# Patient Record
Sex: MALE | Race: WHITE | Employment: OTHER | URBAN - METROPOLITAN AREA
[De-identification: names, ages, dates, MRNs, and addresses within clinical notes are randomized per-mention and may not be internally consistent; named-entity substitution may affect disease eponyms.]

---

## 2023-05-25 ENCOUNTER — HOSPITAL ENCOUNTER (EMERGENCY)
Age: 73
Discharge: HOME | End: 2023-05-25
Attending: EMERGENCY MEDICINE
Payer: MEDICARE

## 2023-05-25 ENCOUNTER — APPOINTMENT (OUTPATIENT)
Dept: GENERAL RADIOLOGY | Age: 73
End: 2023-05-25
Payer: MEDICARE

## 2023-05-25 VITALS
DIASTOLIC BLOOD PRESSURE: 69 MMHG | OXYGEN SATURATION: 92 % | SYSTOLIC BLOOD PRESSURE: 126 MMHG | RESPIRATION RATE: 15 BRPM | WEIGHT: 195 LBS | TEMPERATURE: 98.3 F | HEART RATE: 69 BPM

## 2023-05-25 DIAGNOSIS — T17.908A ASPIRATION INTO AIRWAY, INITIAL ENCOUNTER: Primary | ICD-10-CM

## 2023-05-25 PROBLEM — R06.02 SOB (SHORTNESS OF BREATH): Status: ACTIVE | Noted: 2023-05-25

## 2023-05-25 PROBLEM — R06.2 WHEEZING: Status: ACTIVE | Noted: 2023-05-25

## 2023-05-25 PROCEDURE — 99153 MOD SED SAME PHYS/QHP EA: CPT | Performed by: INTERNAL MEDICINE

## 2023-05-25 PROCEDURE — 99222 1ST HOSP IP/OBS MODERATE 55: CPT | Performed by: INTERNAL MEDICINE

## 2023-05-25 PROCEDURE — 6360000002 HC RX W HCPCS: Performed by: INTERNAL MEDICINE

## 2023-05-25 PROCEDURE — 2709999900 HC NON-CHARGEABLE SUPPLY: Performed by: INTERNAL MEDICINE

## 2023-05-25 PROCEDURE — 6370000000 HC RX 637 (ALT 250 FOR IP): Performed by: INTERNAL MEDICINE

## 2023-05-25 PROCEDURE — 2580000003 HC RX 258: Performed by: INTERNAL MEDICINE

## 2023-05-25 PROCEDURE — 99152 MOD SED SAME PHYS/QHP 5/>YRS: CPT | Performed by: INTERNAL MEDICINE

## 2023-05-25 PROCEDURE — 96374 THER/PROPH/DIAG INJ IV PUSH: CPT

## 2023-05-25 PROCEDURE — 99285 EMERGENCY DEPT VISIT HI MDM: CPT

## 2023-05-25 PROCEDURE — 3609010800 HC BRONCHOSCOPY ALVEOLAR LAVAGE: Performed by: INTERNAL MEDICINE

## 2023-05-25 PROCEDURE — 87070 CULTURE OTHR SPECIMN AEROBIC: CPT

## 2023-05-25 PROCEDURE — 93005 ELECTROCARDIOGRAM TRACING: CPT | Performed by: EMERGENCY MEDICINE

## 2023-05-25 PROCEDURE — 3609010900 HC BRONCHOSCOPY THERAPUTIC ASPIRATION INITIAL: Performed by: INTERNAL MEDICINE

## 2023-05-25 PROCEDURE — 2500000003 HC RX 250 WO HCPCS: Performed by: INTERNAL MEDICINE

## 2023-05-25 PROCEDURE — A4217 STERILE WATER/SALINE, 500 ML: HCPCS | Performed by: INTERNAL MEDICINE

## 2023-05-25 PROCEDURE — 87205 SMEAR GRAM STAIN: CPT

## 2023-05-25 PROCEDURE — 71046 X-RAY EXAM CHEST 2 VIEWS: CPT

## 2023-05-25 PROCEDURE — 31645 BRNCHSC W/THER ASPIR 1ST: CPT | Performed by: INTERNAL MEDICINE

## 2023-05-25 RX ORDER — IPRATROPIUM BROMIDE AND ALBUTEROL SULFATE 2.5; .5 MG/3ML; MG/3ML
1 SOLUTION RESPIRATORY (INHALATION) EVERY 4 HOURS PRN
Status: DISCONTINUED | OUTPATIENT
Start: 2023-05-25 | End: 2023-05-25 | Stop reason: HOSPADM

## 2023-05-25 RX ORDER — MAGNESIUM HYDROXIDE 1200 MG/15ML
LIQUID ORAL CONTINUOUS PRN
Status: COMPLETED | OUTPATIENT
Start: 2023-05-25 | End: 2023-05-25

## 2023-05-25 RX ORDER — LIDOCAINE HYDROCHLORIDE 20 MG/ML
INJECTION, SOLUTION INFILTRATION; PERINEURAL PRN
Status: DISCONTINUED | OUTPATIENT
Start: 2023-05-25 | End: 2023-05-25 | Stop reason: ALTCHOICE

## 2023-05-25 RX ORDER — MIDAZOLAM HYDROCHLORIDE 5 MG/ML
INJECTION INTRAMUSCULAR; INTRAVENOUS PRN
Status: DISCONTINUED | OUTPATIENT
Start: 2023-05-25 | End: 2023-05-25 | Stop reason: ALTCHOICE

## 2023-05-25 RX ORDER — PANTOPRAZOLE SODIUM 20 MG/1
20 TABLET, DELAYED RELEASE ORAL
Qty: 30 TABLET | Refills: 0 | Status: SHIPPED | OUTPATIENT
Start: 2023-05-25

## 2023-05-25 RX ORDER — FENTANYL CITRATE 50 UG/ML
INJECTION, SOLUTION INTRAMUSCULAR; INTRAVENOUS PRN
Status: DISCONTINUED | OUTPATIENT
Start: 2023-05-25 | End: 2023-05-25 | Stop reason: ALTCHOICE

## 2023-05-25 RX ORDER — ACETYLCYSTEINE 200 MG/ML
SOLUTION ORAL; RESPIRATORY (INHALATION) PRN
Status: DISCONTINUED | OUTPATIENT
Start: 2023-05-25 | End: 2023-05-25 | Stop reason: ALTCHOICE

## 2023-05-25 RX ORDER — METHYLPREDNISOLONE SODIUM SUCCINATE 125 MG/2ML
80 INJECTION, POWDER, LYOPHILIZED, FOR SOLUTION INTRAMUSCULAR; INTRAVENOUS DAILY
Status: DISCONTINUED | OUTPATIENT
Start: 2023-05-25 | End: 2023-05-25 | Stop reason: HOSPADM

## 2023-05-25 RX ORDER — METHYLPREDNISOLONE 4 MG/1
TABLET ORAL
Qty: 1 KIT | Refills: 0 | Status: SHIPPED | OUTPATIENT
Start: 2023-05-25 | End: 2023-05-31

## 2023-05-25 RX ADMIN — IPRATROPIUM BROMIDE AND ALBUTEROL SULFATE 1 AMPULE: .5; 2.5 SOLUTION RESPIRATORY (INHALATION) at 16:24

## 2023-05-25 RX ADMIN — METHYLPREDNISOLONE SODIUM SUCCINATE 80 MG: 125 INJECTION INTRAMUSCULAR; INTRAVENOUS at 16:24

## 2023-05-25 ASSESSMENT — PAIN SCALES - GENERAL: PAINLEVEL_OUTOF10: 0

## 2023-05-25 ASSESSMENT — PAIN - FUNCTIONAL ASSESSMENT: PAIN_FUNCTIONAL_ASSESSMENT: 0-10

## 2023-05-25 NOTE — CONSULTS
INPATIENT PULMONARY CRITICAL CARE CONSULT NOTE      Chief Complaint/Referring Provider:  Patient is being seen at the request of Dr. Jonathan Wong for a consultation for aspirated tablet;new wheezing     Presenting HPI: Patient came to the hospital because of increasing shortness of breath    As per ER provider-Patient is a generally healthy 68-year-old male denies any medical history denies any medications who presents referred from urgent care for concern for aspiration of a supplement that he took this morning. Patient was swallowing his vitamins and took a kelp supplement and reports that he aspirated it and tried to cough it out but has been unsuccessful. He has had fairly persistent cough and feels like he \"has bronchitis\". Can feel wheezing in his chest.  Denies any shortness of breath. He takes a deep breath or does anything exertional triggers a coughing episode. No lightheadedness. Patient when seen was still symptomatic, patient was still having some shortness of breath and congestion along with wheezing, no chest pain, patient was not having any significant expectoration, patient was afebrile and hemodynamically maintained, patient was alert and oriented, no other pertinent review of system of concern     Patient Active Problem List    Diagnosis Date Noted    SOB (shortness of breath) 05/25/2023    Wheezing 05/25/2023    Aspiration into airway 05/25/2023       History reviewed. No pertinent past medical history. History reviewed. No pertinent surgical history. History reviewed. No pertinent family history. Social History     Tobacco Use    Smoking status: Never    Smokeless tobacco: Never   Substance Use Topics    Alcohol use: Not on file        No Known Allergies            Physical Exam:  Blood pressure (!) 147/78, pulse 67, temperature 98.3 °F (36.8 °C), temperature source Oral, resp. rate 17, weight 195 lb (88.5 kg), SpO2 97 %.'   Constitutional:  No acute distress.    HENT:  Oropharynx

## 2023-05-25 NOTE — ED PROVIDER NOTES
Emergency Department Provider Note  Location: [facility]  ED  5/25/2023     Patient Identification  Ivy Frank is a 81 y.o. male    Chief Complaint  Shortness of Breath (Sent by urgent care for aspiration pneumonia, feels like he had a supplement go down the wrong tube, having a lot of wheezing, started this am)          HPI  (History provided by patient)  Patient is a generally healthy 74-year-old male denies any medical history denies any medications who presents referred from urgent care for concern for aspiration of a supplement that he took this morning. Patient was swallowing his vitamins and took a kelp supplement and reports that he aspirated it and tried to cough it out but has been unsuccessful. He has had fairly persistent cough and feels like he \"has bronchitis\". Can feel wheezing in his chest.  Denies any shortness of breath. He takes a deep breath or does anything exertional triggers a coughing episode. No lightheadedness. I have reviewed the following nursing documentation:  Allergies: No Known Allergies    Past medical history:  has no past medical history on file. Past surgical history:  has no past surgical history on file. Home medications:   Prior to Admission medications    Medication Sig Start Date End Date Taking? Authorizing Provider   methylPREDNISolone (MEDROL DOSEPACK) 4 MG tablet Take by mouth. 5/25/23 5/31/23 Yes Ryne Wynn MD   pantoprazole (PROTONIX) 20 MG tablet Take 1 tablet by mouth every morning (before breakfast) 5/25/23  Yes Ryne Wynn MD       Social history:  reports that he has never smoked. He has never used smokeless tobacco.    Family history:  History reviewed. No pertinent family history. Exam  ED Triage Vitals [05/25/23 1445]   BP Temp Temp Source Pulse Respirations SpO2 Height Weight - Scale   (!) 147/78 98.3 °F (36.8 °C) Oral 67 17 97 % -- 195 lb (88.5 kg)       Physical Exam  Vitals and nursing note reviewed.

## 2023-05-25 NOTE — ED NOTES
Writer ambulated patient ~100 feet to ER bathroom and back to room 1 with steady gait. Patient reports no dizziness or trouble walking. Provider aware.       Ricardo Pond RN  05/25/23 7495

## 2023-05-25 NOTE — PRE SEDATION
Sedation Pre-Procedure Note    Patient Name: Griselda Grumbles   YOB: 1950  Room/Bed: 01/01  Medical Record Number: [id number]  Date: 5/25/2023   Time: 6:00 PM       Indication: Aspiration of tablet into the airway, shortness of breath and wheezing    Consent: I have discussed with the patient and/or the patient representative the indication, alternatives, and the possible risks and/or complications of the planned procedure and the anesthesia methods. The patient and/or patient representative appear to understand and agree to proceed. Vital Signs:   Vitals:    05/25/23 1740   BP: (!) 150/63   Pulse: 72   Resp: 25   Temp:    SpO2:        Past Medical History:   has no past medical history on file. Past Surgical History:   has no past surgical history on file. Medications:   Scheduled Meds:    methylPREDNISolone  80 mg IntraVENous Daily     Continuous Infusions:   PRN Meds: ipratropium 0.5 mg-albuterol 2.5 mg  Home Meds:   Prior to Admission medications    Not on File         Pre-Sedation Documentation and Exam:   Vital signs have been reviewed (see flow sheet for vitals).     Mallampati Airway Assessment:  Mallampati Class II - (soft palate, fauces & uvula are visible)    Prior History of Anesthesia Complications:   none    ASA Classification:  Class 4 - A patient with an incapacitating systemic disease that is a constant threat to life    Sedation/ Anesthesia Plan:   intravenous sedation    Medications Planned:   midazolam (Versed) intravenously and fentanyl intravenously    Patient is an appropriate candidate for plan of sedation: yes    Electronically signed by Karla Hale MD on 5/25/2023 at 6:00 PM

## 2023-05-25 NOTE — ED NOTES
Writer reviewed discharge instructions, medications, and follow-up with PCP, otolaryngology, GI, and Pulmonology; patient verbalized understanding. Patient's IV removed with no complications with dressing in place. Patient stable, ambulatory with steady gait, GCS 15, no signs/ symptoms of acute distress, respirations unlabored, and with friend.       Haydee French RN  05/25/23 1930

## 2023-05-25 NOTE — ED NOTES
@7203 PS  per DR. Nieves  RE:aspirated tablet, new wheezing  @1991 Noam Green was in ER Department and saw pt

## 2023-05-25 NOTE — PROCEDURES
Bronchoscopy note    Patient with shortness of breath, acute wheezing and aspiration of a tablet into the airway and given the patient's clinical status and the data available it was decided to do an emergent endoscopy and for that reason after informed consent, endoscopy team was requested to come to the bedside, patient was given oropharyngeal analgesia with benzocaine after timeout, patient was given lidocaine for tracheobronchial analgesia as following-Physician/patient of face-to-face sedation start time was 5.17 pm  Physician/patient face-to-face sedation stop time was 5.35 pm   Total moderate sedation time in minutes was 18 mi nuts   The patient was monitored continuously  throughout the entire procedure while the sedation was being administered    Patient was given conscious sedation with 5 mg of Versed and 100 mcg of fentanyl for the procedure in stages, the bronchoscope was increased to the mouth using a bite block, patient was found to have incomplete adduction of the vocal cords, patient's trachea was normal, patient's jorge l was sharp, patient's left tracheobronchial tree was normal, patient's right upper lobe bronchus was normal, patient had some secretions right middle lobe which was suctioned out, patient is entire right lower lobe bronchus had dissolving particles of the tablet in pieces along with that there was a lot of particulate matter which was stuck on the jorge l was of the various subsegments of the right lower lobe which were removed piece by piece using Mucomyst and saline with improvement in the patency of the airways, patient was also found to have some tracheobronchomalacia, patient did not have any endobronchial lesion, patient tolerated the procedure well and did not have any apparent immediate complications  Patient was snoring after the procedure  Estimated blood loss was 0  Bronchial aspirate sent for routine culture    Bronchoscopy findings were discussed with ER

## 2023-05-25 NOTE — DISCHARGE INSTRUCTIONS
You aspirated. You to follow-up with GI doctor for a swallow study. You also need to follow-up with an ears and throat doctor because you had abnormal movement of vocal cords noted during bronchoscopy. On arrange follow-up appointments. Start medications as prescribed. Return to emergency department if you have any new emergent concerns.

## 2023-05-26 LAB
EKG ATRIAL RATE: 65 BPM
EKG DIAGNOSIS: NORMAL
EKG P AXIS: 44 DEGREES
EKG P-R INTERVAL: 156 MS
EKG Q-T INTERVAL: 422 MS
EKG QRS DURATION: 94 MS
EKG QTC CALCULATION (BAZETT): 438 MS
EKG R AXIS: -2 DEGREES
EKG T AXIS: 32 DEGREES
EKG VENTRICULAR RATE: 65 BPM

## 2023-05-26 PROCEDURE — 93010 ELECTROCARDIOGRAM REPORT: CPT | Performed by: INTERNAL MEDICINE

## 2023-05-27 LAB
BACTERIA SPEC RESP CULT: NORMAL
GRAM STN SPEC: NORMAL

## (undated) DEVICE — SYRINGE MED 10ML SLIP TIP BLNT FILL AND LUERLOCK DISP

## (undated) DEVICE — ADAPTER,CATHETER/SYRINGE/LUER,STERILE: Brand: MEDLINE

## (undated) DEVICE — TRAP,MUCUS SPECIMEN, 80CC: Brand: MEDLINE

## (undated) DEVICE — CONMED SCOPE SAVER BITE BLOCK, 20X27 MM: Brand: SCOPE SAVER

## (undated) DEVICE — LINER,SOFT,SUCTION CANISTER,1500CC: Brand: MEDLINE

## (undated) DEVICE — CANNULA,OXY,ADULT,SUPERSOFT,W/7'TUB,SC: Brand: MEDLINE INDUSTRIES, INC.

## (undated) DEVICE — ELECTRODE,RADIOTRANSLUCENT,FOAM,3PK: Brand: MEDLINE

## (undated) DEVICE — ENDO CARRY-ON PROCEDURE KIT INCLUDES SUCTION TUBING, LUBRICANT, GAUZE, BIOHAZARD STICKER, TRANSPORT PAD AND INTERCEPT BEDSIDE KIT.: Brand: ENDO CARRY-ON PROCEDURE KIT

## (undated) DEVICE — TUBING, SUCTION, 3/16" X 12', STRAIGHT: Brand: MEDLINE

## (undated) DEVICE — SYRINGE MED 10ML LUERLOCK TIP W/O SFTY DISP

## (undated) DEVICE — SINGLE USE SUCTION VALVE MAJ-209: Brand: SINGLE USE SUCTION VALVE (STERILE)

## (undated) DEVICE — YANKAUER,BULB TIP,W/O VENT,RIGID,STERILE: Brand: MEDLINE

## (undated) DEVICE — SINGLE USE BIOPSY VALVE MAJ-210: Brand: SINGLE USE BIOPSY VALVE (STERILE)

## (undated) DEVICE — SYRINGE MED 50ML LUERSLIP TIP

## (undated) DEVICE — BOWL MED M 16OZ PLAS CAP GRAD

## (undated) DEVICE — TUBING, SUCTION, 3/16" X 6', STRAIGHT: Brand: MEDLINE